# Patient Record
Sex: MALE | Race: BLACK OR AFRICAN AMERICAN
[De-identification: names, ages, dates, MRNs, and addresses within clinical notes are randomized per-mention and may not be internally consistent; named-entity substitution may affect disease eponyms.]

---

## 2021-10-04 ENCOUNTER — HOSPITAL ENCOUNTER (EMERGENCY)
Dept: HOSPITAL 41 - JD.ED | Age: 31
Discharge: HOME | End: 2021-10-04
Payer: COMMERCIAL

## 2021-10-04 VITALS — DIASTOLIC BLOOD PRESSURE: 102 MMHG | HEART RATE: 71 BPM | SYSTOLIC BLOOD PRESSURE: 137 MMHG
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
## 2021-10-04 DIAGNOSIS — S86.012A: Primary | ICD-10-CM

## 2022-11-22 NOTE — EDM.PDOC
ED HPI GENERAL MEDICAL PROBLEM





- General


Chief Complaint: Lower Extremity Injury/Pain


Stated Complaint: SPARP HEEL PAIN


Time Seen by Provider: 10/04/21 15:48


Source of Information: Reports: Patient, RN Notes Reviewed





- History of Present Illness


INITIAL COMMENTS - FREE TEXT/NARRATIVE: 





31 yr old male comes in with L achilles pain.  Started 2 days ago.  Not aware of

any particular injury.  No pain at rest. Mild to moderate pain with walking. 


  ** Left Foot


Pain Score (Numeric/FACES): 5





- Related Data


                                    Allergies











Allergy/AdvReac Type Severity Reaction Status Date / Time


 


No Known Allergies Allergy   Verified 03/10/16 06:48











Home Meds: 


                                    Home Meds





. [No Known Home Meds]  10/04/21 [History]











Past Medical History





- Past Health History


Medical/Surgical History: Denies Medical/Surgical History





- Infectious Disease History


Infectious Disease History: Reports: None





Social & Family History





- Tobacco Use


Tobacco Use Status *Q: Never Tobacco User





- Caffeine Use


Caffeine Use: Reports: None





- Recreational Drug Use


Recreational Drug Use: No





Review of Systems





- Review of Systems


Review Of Systems: See Below


Constitutional: Reports: No Symptoms


Respiratory: Reports: No Symptoms


Cardiovascular: Reports: No Symptoms


GI/Abdominal: Reports: No Symptoms


Musculoskeletal: Reports: Other (L achilles pain)


Skin: Reports: No Symptoms


Neurological: Reports: No Symptoms





ED EXAM, GENERAL





- Physical Exam


Exam: See Below


General Appearance: Alert, No Apparent Distress


Head: Atraumatic


Neck: Supple


Respiratory/Chest: No Respiratory Distress


Extremities: Other (very mild tenderness L achilles at attachment area to L 

heel, no palpable defect or deformity,  no swelling,  foot otherwise nontender, 

ankle nontender, no pain with flexion or dorsiflexion, no visible pain walking)





Course





- Vital Signs


Last Recorded V/S: 


                                Last Vital Signs











Temp  98.6 F   10/04/21 14:43


 


Pulse  71   10/04/21 14:43


 


Resp  20   10/04/21 14:43


 


BP  137/102 H  10/04/21 14:43


 


Pulse Ox  99   10/04/21 14:43














- Re-Assessments/Exams


Free Text/Narrative Re-Assessment/Exam: 





10/05/21 07:19


X rays not clinically indicated





Departure





- Departure


Time of Disposition: 16:11


Disposition: Home, Self-Care 01


Condition: Fair


Clinical Impression: 


Strain of Achilles tendon


Qualifiers:


 Encounter type: initial encounter Laterality: left Qualified Code(s): S86.012A 

- Strain of left Achilles tendon, initial encounter








- Discharge Information


Instructions:  Muscle Strain


Referrals: 


PCP,None [Primary Care Provider] - 


Forms:  ED Department Discharge


Additional Instructions: 


Ace wrap L ankle.  Rest and elevate foot and ankle as much as possible.  Motrin 

or ibuprofen 600 mg (3 tabs 3 times daily for pain as needed)   Follow up clinic

if not much better within 4 to 5 days as expected. Rinvoq Counseling: I discussed with the patient the risks of Rinvoq therapy including but not limited to upper respiratory tract infections, shingles, cold sores, bronchitis, nausea, cough, fever, acne, and headache. Live vaccines should be avoided.  This medication has been linked to serious infections; higher rate of mortality; malignancy and lymphoproliferative disorders; major adverse cardiovascular events; thrombosis; thrombocytopenia, anemia, and neutropenia; lipid elevations; liver enzyme elevations; and gastrointestinal perforations.

## 2022-12-10 ENCOUNTER — HOSPITAL ENCOUNTER (EMERGENCY)
Dept: HOSPITAL 41 - JD.ED | Age: 32
Discharge: HOME | End: 2022-12-10
Payer: COMMERCIAL

## 2022-12-10 VITALS — DIASTOLIC BLOOD PRESSURE: 96 MMHG | HEART RATE: 71 BPM | SYSTOLIC BLOOD PRESSURE: 144 MMHG

## 2022-12-10 DIAGNOSIS — K59.00: Primary | ICD-10-CM

## 2022-12-10 LAB — EGFRCR SERPLBLD CKD-EPI 2021: 68 ML/MIN (ref 60–?)

## 2022-12-10 PROCEDURE — 81003 URINALYSIS AUTO W/O SCOPE: CPT

## 2022-12-10 PROCEDURE — 80053 COMPREHEN METABOLIC PANEL: CPT

## 2022-12-10 PROCEDURE — 99284 EMERGENCY DEPT VISIT MOD MDM: CPT

## 2022-12-10 PROCEDURE — 74019 RADEX ABDOMEN 2 VIEWS: CPT

## 2022-12-10 PROCEDURE — 36415 COLL VENOUS BLD VENIPUNCTURE: CPT

## 2022-12-10 PROCEDURE — 85025 COMPLETE CBC W/AUTO DIFF WBC: CPT
